# Patient Record
Sex: MALE | Race: WHITE | HISPANIC OR LATINO | Employment: STUDENT | URBAN - METROPOLITAN AREA
[De-identification: names, ages, dates, MRNs, and addresses within clinical notes are randomized per-mention and may not be internally consistent; named-entity substitution may affect disease eponyms.]

---

## 2018-03-28 ENCOUNTER — TELEPHONE (OUTPATIENT)
Dept: PEDIATRICS | Facility: CLINIC | Age: 23
End: 2018-03-28

## 2018-03-28 NOTE — TELEPHONE ENCOUNTER
----- Message from Linda Camargo sent at 3/27/2018  4:54 PM CDT -----  Contact: Pt (cell# [phone])   Calling to get a copy of the Pt shot record. Pt needs it faxed to ( Fax# [phone] ). Also, Pt would like it mailed to address on file. Please call to confirm  --------  Pt (cell# [phone])

## 2018-12-20 ENCOUNTER — OFFICE VISIT (OUTPATIENT)
Dept: UROLOGY | Facility: CLINIC | Age: 23
End: 2018-12-20
Payer: COMMERCIAL

## 2018-12-20 VITALS
TEMPERATURE: 98 F | HEIGHT: 68 IN | DIASTOLIC BLOOD PRESSURE: 80 MMHG | HEART RATE: 98 BPM | BODY MASS INDEX: 27.28 KG/M2 | SYSTOLIC BLOOD PRESSURE: 139 MMHG | WEIGHT: 180 LBS

## 2018-12-20 DIAGNOSIS — N20.0 KIDNEY STONE: Primary | ICD-10-CM

## 2018-12-20 LAB
BILIRUB SERPL-MCNC: ABNORMAL MG/DL
BLOOD URINE, POC: ABNORMAL
COLOR, POC UA: YELLOW
GLUCOSE UR QL STRIP: ABNORMAL
KETONES UR QL STRIP: ABNORMAL
LEUKOCYTE ESTERASE URINE, POC: ABNORMAL
NITRITE, POC UA: ABNORMAL
PH, POC UA: 5
PROTEIN, POC: ABNORMAL
SPECIFIC GRAVITY, POC UA: 1.02
UROBILINOGEN, POC UA: ABNORMAL

## 2018-12-20 PROCEDURE — 3008F BODY MASS INDEX DOCD: CPT | Mod: CPTII,S$GLB,, | Performed by: UROLOGY

## 2018-12-20 PROCEDURE — 87086 URINE CULTURE/COLONY COUNT: CPT

## 2018-12-20 PROCEDURE — 99999 PR PBB SHADOW E&M-EST. PATIENT-LVL III: CPT | Mod: PBBFAC,,, | Performed by: UROLOGY

## 2018-12-20 PROCEDURE — 99204 OFFICE O/P NEW MOD 45 MIN: CPT | Mod: 25,S$GLB,, | Performed by: UROLOGY

## 2018-12-20 PROCEDURE — 81002 URINALYSIS NONAUTO W/O SCOPE: CPT | Mod: S$GLB,,, | Performed by: UROLOGY

## 2018-12-20 RX ORDER — OXYCODONE AND ACETAMINOPHEN 5; 325 MG/1; MG/1
TABLET ORAL
Refills: 0 | COMMUNITY
Start: 2018-12-16

## 2018-12-20 RX ORDER — TAMSULOSIN HYDROCHLORIDE 0.4 MG/1
0.4 CAPSULE ORAL
Qty: 30 CAPSULE | Refills: 1 | Status: SHIPPED | OUTPATIENT
Start: 2018-12-20 | End: 2019-01-19

## 2018-12-20 RX ORDER — SULFAMETHOXAZOLE AND TRIMETHOPRIM 800; 160 MG/1; MG/1
TABLET ORAL
Refills: 0 | COMMUNITY
Start: 2018-12-16 | End: 2019-01-07

## 2018-12-20 RX ORDER — ONDANSETRON 4 MG/1
TABLET, ORALLY DISINTEGRATING ORAL
Refills: 1 | COMMUNITY
Start: 2018-12-16

## 2018-12-21 NOTE — PROGRESS NOTES
HPI:  Vasquez Tidwell is a 22 y.o. year old male that  presents with   Chief Complaint   Patient presents with    Nephrolithiasis   .  This patient referred himself to the office for emergency room follow-up for ureteral stone    Patient was seen 4 days ago at [facility] emergency room with right lower quadrant pain.  He brings in printed records which show a 3 mm distal right UVJ stone.  Serum creatinine 1.2 and serum calcium 9.8.    Patient now comes in follow-up was not yet passed the stone.  He has no fevers chills gross hematuria.  He is not having any pain or nausea vomiting    Patient was empirically started on antibiotics in the emergency room    This is his 1st stone episode and there is no family history of kidney stones    There is no family history of kidney bladder prostate cancer the patient has never had urologic surgery    There is nothing in the [facility] chart    Past Medical History:   Diagnosis Date    ADHD (attention deficit hyperactivity disorder)     Disorder of kidney and ureter     Kidney stone      Social History     Socioeconomic History    Marital status: Single     Spouse name: Not on file    Number of children: Not on file    Years of education: Not on file    Highest education level: Not on file   Social Needs    Financial resource strain: Not on file    Food insecurity - worry: Not on file    Food insecurity - inability: Not on file    Transportation needs - medical: Not on file    Transportation needs - non-medical: Not on file   Occupational History    Not on file   Tobacco Use    Smoking status: Never Smoker   Substance and Sexual Activity    Alcohol use: No    Drug use: No    Sexual activity: No   Other Topics Concern    Not on file   Social History Narrative    Lives with parents, sister, and grandmother. 2 parakeets. No smokers. Graduated from [employer] and attending [employer] in the Fall.     History reviewed. No pertinent surgical history.  Family History  "  Problem Relation Age of Onset    Diabetes Mother     Diabetes Maternal Grandfather     Cancer Maternal Grandfather     Heart disease Maternal Grandfather            Review of Systems  The patient has no chest pains.  The patient has no shortness of breath  Patient wears        glasses.  All other review of systems are negative.      Physical Exam:  /80 (BP Location: Left arm, Patient Position: Sitting, BP Method: Large (Automatic))   Pulse 98   Temp 98.3 °F (36.8 °C) (Oral)   Ht 5' 7.72" (1.72 m)   Wt 81.6 kg (180 lb)   BMI 27.60 kg/m²   General appearance: alert, cooperative, no distress  Constitutional:Oriented to person, place, and time.appears well-developed and well-nourished.   HEENT: Normocephalic, atraumatic, neck symmetrical, no nasal discharge   Eyes: conjunctivae/corneas clear, PERRL, EOM's intact  Lungs: clear to auscultation bilaterally, no dullness to percussion bilaterally  Heart: regular rate and rhythm without rub; no displacement of the PMI   Abdomen: soft, non-tender; bowel sounds normoactive; no organomegaly  :  Penis/perineum without lesions, scrotum without rash/cysts, epididymis nontender bilaterally, urethral meatus in normal location normal size, no penile plaques palpated testes equal in size without masses.  Extremities: extremities symmetric; no clubbing, cyanosis, or edema  Integument: Skin color, texture, turgor normal; no rashes; hair distrubution normal  Neurologic: Alert and oriented X 3, normal strength, normal coordination and gait  Psychiatric: no pressured speech; normal affect; no evidence of impaired cognition     LABS:    Complete Blood Count  Lab Results   Component Value Date    RBC 5.42 (H) 02/07/2008    HGB 15.2 02/07/2008    HCT 43.5 02/07/2008    MCV 80.3 02/07/2008    MCH 28.0 02/07/2008    MCHC 34.9 02/07/2008    RDW 13.0 02/07/2008     02/07/2008    MPV 10.7 02/07/2008    GRAN 11.7 (H) 02/07/2008    GRAN 78.0 (H) 02/07/2008    LYMPH 18.5 (L) " 02/07/2008    LYMPH 2.8 02/07/2008    MONO 3.3 02/07/2008    MONO 0.5 02/07/2008    EOS 0.0 02/07/2008    BASO 0.0 02/07/2008    EOSINOPHIL 0.1 02/07/2008    BASOPHIL 0.1 02/07/2008       Comprehensive Metabolic Panel  No results found for: GLU, BUN, CREATININE, NA, K, CL, PROT, ALBUMIN, BILITOT, AST, ALKPHOS, CO2, ALT, ANIONGAP, EGFRNONAA, ESTGFRAFRICA    PSA  No results found for: PSA      Assessment:    ICD-10-CM ICD-9-CM    1. Kidney stone N20.0 592.0 POCT URINE DIPSTICK WITHOUT MICROSCOPE      Urine culture     The encounter diagnosis was Kidney stone.      Plan:  1.  Nephrolithiasis.  Plan.  Prescription written for Flomax.  Patient instructed to increase fluid intake and strain his urine.  I discussed with the patient that if the patient develops intractable pain or spiking fever, then the patient should go to the emergency room for evaluation. The patient voices understanding.  Follow-up 2 weeks for recheck.  Patient's urine will be cultured and once results are available ,we will contact the patient if antibiotics are indicated.  Orders Placed This Encounter   Procedures    Urine culture    POCT URINE DIPSTICK WITHOUT MICROSCOPE           Nir Olvera MD    PLEASE NOTE:  Please be advised that portions of this note were dictated using voice recognition software and may contain dictation related errors in spelling/grammar/appropriate pronouns/syntax or other errors that might have not been found and or corrected on text review.

## 2018-12-22 LAB — BACTERIA UR CULT: NORMAL

## 2018-12-24 ENCOUNTER — TELEPHONE (OUTPATIENT)
Dept: UROLOGY | Facility: CLINIC | Age: 23
End: 2018-12-24

## 2018-12-24 NOTE — TELEPHONE ENCOUNTER
----- Message from Olga Dumont MD sent at 12/24/2018  8:06 AM CST -----  I am checking messages on behalf of Dr. Olvera who is out of the office.     Please let patient know the urine culture did not demonstrate a urinary tract infection.

## 2019-01-07 ENCOUNTER — OFFICE VISIT (OUTPATIENT)
Dept: UROLOGY | Facility: CLINIC | Age: 24
End: 2019-01-07
Payer: COMMERCIAL

## 2019-01-07 VITALS
HEART RATE: 82 BPM | BODY MASS INDEX: 27.28 KG/M2 | DIASTOLIC BLOOD PRESSURE: 81 MMHG | TEMPERATURE: 98 F | SYSTOLIC BLOOD PRESSURE: 143 MMHG | HEIGHT: 68 IN | WEIGHT: 180 LBS

## 2019-01-07 DIAGNOSIS — N20.0 NEPHROLITHIASIS: Primary | ICD-10-CM

## 2019-01-07 PROCEDURE — 99213 OFFICE O/P EST LOW 20 MIN: CPT | Mod: S$GLB,,, | Performed by: UROLOGY

## 2019-01-07 PROCEDURE — 99999 PR PBB SHADOW E&M-EST. PATIENT-LVL III: CPT | Mod: PBBFAC,,, | Performed by: UROLOGY

## 2019-01-07 PROCEDURE — 99213 PR OFFICE/OUTPT VISIT, EST, LEVL III, 20-29 MIN: ICD-10-PCS | Mod: S$GLB,,, | Performed by: UROLOGY

## 2019-01-07 PROCEDURE — 3008F PR BODY MASS INDEX (BMI) DOCUMENTED: ICD-10-PCS | Mod: CPTII,S$GLB,, | Performed by: UROLOGY

## 2019-01-07 PROCEDURE — 3008F BODY MASS INDEX DOCD: CPT | Mod: CPTII,S$GLB,, | Performed by: UROLOGY

## 2019-01-07 PROCEDURE — 99999 PR PBB SHADOW E&M-EST. PATIENT-LVL III: ICD-10-PCS | Mod: PBBFAC,,, | Performed by: UROLOGY

## 2019-01-08 NOTE — PROGRESS NOTES
"This patient was last seen by me December 20, 2018 for emergency room follow-up for a 3 mm distal stone per x-ray done at [facility]    Patient now comes in follow-up and states that he thinks he passed his stone however did not recover    Patient currently having no flank pain nausea vomiting    Physical exam reveals reveals a well-developed well-nourished patient  in no acute distress.  Patient is alert and oriented ×3 with normal mood and affect.  Respiratory effort is normal and there is no peripheral edema.  Skin is normal to inspection and palpation    BP (!) 143/81 (BP Location: Right arm, Patient Position: Sitting)   Pulse 82   Temp 98.1 °F (36.7 °C)   Ht 5' 7.72" (1.72 m)   Wt 81.6 kg (180 lb)   BMI 27.60 kg/m²   Review of Systems  General ROS: negative for chills, fever or weight loss  Respiratory ROS: no cough, shortness of breath, or wheezing  Cardiovascular ROS: no chest pain or dyspnea on exertion  Musculoskeletal ROS: negative for gait disturbance or muscular weakness    Family History   Problem Relation Age of Onset    Diabetes Mother     Diabetes Maternal Grandfather     Cancer Maternal Grandfather     Heart disease Maternal Grandfather      Past Medical History:   Diagnosis Date    ADHD (attention deficit hyperactivity disorder)     Disorder of kidney and ureter     Kidney stone      Family History   Problem Relation Age of Onset    Diabetes Mother     Diabetes Maternal Grandfather     Cancer Maternal Grandfather     Heart disease Maternal Grandfather      Social History     Tobacco Use    Smoking status: Never Smoker   Substance Use Topics    Alcohol use: No       Impression:      ICD-10-CM ICD-9-CM    1. Nephrolithiasis N20.0 592.0        Plan:    #1.  Nephrolithiasis.  Plan.  Presumed spontaneous passage of tiny stone.  No stone recovered.  Discussed the patient the importance of increasing fluid intake.  At this point follow up with me on an as-needed basis    PLEASE NOTE: "  Please be advised that portions of this note were dictated using voice recognition software and may contain dictation related errors in spelling/grammar/appropriate pronouns/syntax or other errors that might have not been found and or corrected on text review.

## 2020-11-25 ENCOUNTER — CLINICAL SUPPORT (OUTPATIENT)
Dept: URGENT CARE | Facility: CLINIC | Age: 25
End: 2020-11-25
Payer: COMMERCIAL

## 2020-11-25 DIAGNOSIS — Z03.818 ENCOUNTER FOR OBSERVATION FOR SUSPECTED EXPOSURE TO OTHER BIOLOGICAL AGENTS RULED OUT: Primary | ICD-10-CM

## 2020-11-25 LAB
CTP QC/QA: YES
SARS-COV-2 RDRP RESP QL NAA+PROBE: NEGATIVE

## 2020-11-25 PROCEDURE — U0002 COVID-19 LAB TEST NON-CDC: HCPCS | Mod: QW,S$GLB,, | Performed by: INTERNAL MEDICINE

## 2020-11-25 PROCEDURE — U0002: ICD-10-PCS | Mod: QW,S$GLB,, | Performed by: INTERNAL MEDICINE

## 2021-04-16 ENCOUNTER — PATIENT MESSAGE (OUTPATIENT)
Dept: RESEARCH | Facility: HOSPITAL | Age: 26
End: 2021-04-16